# Patient Record
Sex: FEMALE | Race: WHITE | Employment: UNEMPLOYED | URBAN - METROPOLITAN AREA
[De-identification: names, ages, dates, MRNs, and addresses within clinical notes are randomized per-mention and may not be internally consistent; named-entity substitution may affect disease eponyms.]

---

## 2017-12-09 PROCEDURE — 87591 N.GONORRHOEAE DNA AMP PROB: CPT | Performed by: INTERNAL MEDICINE

## 2017-12-09 PROCEDURE — 87660 TRICHOMONAS VAGIN DIR PROBE: CPT | Performed by: INTERNAL MEDICINE

## 2017-12-09 PROCEDURE — 87480 CANDIDA DNA DIR PROBE: CPT | Performed by: INTERNAL MEDICINE

## 2017-12-09 PROCEDURE — 80061 LIPID PANEL: CPT | Performed by: INTERNAL MEDICINE

## 2017-12-09 PROCEDURE — 87510 GARDNER VAG DNA DIR PROBE: CPT | Performed by: INTERNAL MEDICINE

## 2017-12-09 PROCEDURE — 87491 CHLMYD TRACH DNA AMP PROBE: CPT | Performed by: INTERNAL MEDICINE

## 2019-08-24 ENCOUNTER — HOSPITAL ENCOUNTER (INPATIENT)
Facility: HOSPITAL | Age: 49
LOS: 2 days | Discharge: HOME | DRG: 392 | End: 2019-08-26
Attending: EMERGENCY MEDICINE | Admitting: HOSPITALIST
Payer: COMMERCIAL

## 2019-08-24 DIAGNOSIS — K51.00 PANCOLITIS (HCC): Primary | ICD-10-CM

## 2019-08-24 DIAGNOSIS — I95.9 HYPOTENSION, UNSPECIFIED HYPOTENSION TYPE: ICD-10-CM

## 2019-08-24 DIAGNOSIS — R19.7 DIARRHEA OF PRESUMED INFECTIOUS ORIGIN: ICD-10-CM

## 2019-08-24 DIAGNOSIS — E86.0 DEHYDRATION: ICD-10-CM

## 2019-08-24 LAB — C DIFF TOX B STL QL: NEGATIVE

## 2019-08-24 PROCEDURE — 99223 1ST HOSP IP/OBS HIGH 75: CPT | Performed by: HOSPITALIST

## 2019-08-24 PROCEDURE — 87086 URINE CULTURE/COLONY COUNT: CPT | Performed by: EMERGENCY MEDICINE

## 2019-08-24 PROCEDURE — 87186 SC STD MICRODIL/AGAR DIL: CPT | Performed by: EMERGENCY MEDICINE

## 2019-08-24 PROCEDURE — 87088 URINE BACTERIA CULTURE: CPT | Performed by: EMERGENCY MEDICINE

## 2019-08-24 RX ORDER — HYDROMORPHONE HYDROCHLORIDE 1 MG/ML
0.5 INJECTION, SOLUTION INTRAMUSCULAR; INTRAVENOUS; SUBCUTANEOUS EVERY 30 MIN PRN
Status: DISCONTINUED | OUTPATIENT
Start: 2019-08-24 | End: 2019-08-24

## 2019-08-24 RX ORDER — HYDROMORPHONE HYDROCHLORIDE 1 MG/ML
0.2 INJECTION, SOLUTION INTRAMUSCULAR; INTRAVENOUS; SUBCUTANEOUS EVERY 2 HOUR PRN
Status: DISCONTINUED | OUTPATIENT
Start: 2019-08-24 | End: 2019-08-26

## 2019-08-24 RX ORDER — DEXTROSE, SODIUM CHLORIDE, AND POTASSIUM CHLORIDE 5; .45; .15 G/100ML; G/100ML; G/100ML
INJECTION INTRAVENOUS CONTINUOUS
Status: DISCONTINUED | OUTPATIENT
Start: 2019-08-24 | End: 2019-08-26

## 2019-08-24 RX ORDER — ENOXAPARIN SODIUM 100 MG/ML
40 INJECTION SUBCUTANEOUS NIGHTLY
Status: DISCONTINUED | OUTPATIENT
Start: 2019-08-24 | End: 2019-08-26

## 2019-08-24 RX ORDER — METRONIDAZOLE 500 MG/100ML
500 INJECTION, SOLUTION INTRAVENOUS EVERY 8 HOURS
Status: DISCONTINUED | OUTPATIENT
Start: 2019-08-24 | End: 2019-08-26

## 2019-08-24 RX ORDER — HYDROMORPHONE HYDROCHLORIDE 1 MG/ML
0.4 INJECTION, SOLUTION INTRAMUSCULAR; INTRAVENOUS; SUBCUTANEOUS EVERY 2 HOUR PRN
Status: DISCONTINUED | OUTPATIENT
Start: 2019-08-24 | End: 2019-08-26

## 2019-08-24 RX ORDER — CIPROFLOXACIN 2 MG/ML
400 INJECTION, SOLUTION INTRAVENOUS EVERY 12 HOURS
Status: DISCONTINUED | OUTPATIENT
Start: 2019-08-24 | End: 2019-08-26

## 2019-08-24 RX ORDER — ACETAMINOPHEN 325 MG/1
650 TABLET ORAL EVERY 6 HOURS PRN
Status: DISCONTINUED | OUTPATIENT
Start: 2019-08-24 | End: 2019-08-26

## 2019-08-24 RX ORDER — ONDANSETRON 2 MG/ML
4 INJECTION INTRAMUSCULAR; INTRAVENOUS
Status: DISCONTINUED | OUTPATIENT
Start: 2019-08-24 | End: 2019-08-25 | Stop reason: ALTCHOICE

## 2019-08-24 RX ORDER — ONDANSETRON 2 MG/ML
4 INJECTION INTRAMUSCULAR; INTRAVENOUS EVERY 6 HOURS PRN
Status: DISCONTINUED | OUTPATIENT
Start: 2019-08-24 | End: 2019-08-26

## 2019-08-24 RX ORDER — ONDANSETRON 2 MG/ML
4 INJECTION INTRAMUSCULAR; INTRAVENOUS EVERY 4 HOURS PRN
Status: DISCONTINUED | OUTPATIENT
Start: 2019-08-24 | End: 2019-08-24

## 2019-08-24 RX ORDER — HYDROMORPHONE HYDROCHLORIDE 1 MG/ML
0.8 INJECTION, SOLUTION INTRAMUSCULAR; INTRAVENOUS; SUBCUTANEOUS EVERY 2 HOUR PRN
Status: DISCONTINUED | OUTPATIENT
Start: 2019-08-24 | End: 2019-08-26

## 2019-08-24 NOTE — PLAN OF CARE
Patient Joanie Jodie speaking only.  declined  but would like one to be used at night when he is not here. Staff may also call him at home. Information given in Turkish in regards to colitis and c.diff.   very concerned staff and

## 2019-08-24 NOTE — ED NOTES
Antibiotic not given, this rn unsure if meds were given [place] [facility] attempting tocall there, answering machine stating location is closed, dr. Lia Mcnamara aware, not wanting to repeat meds, will pass along to floor rn

## 2019-08-24 NOTE — ED INITIAL ASSESSMENT (HPI)
Patient with abdominal pain at [facility]. She had a CT scan performed and became hypotensive with SBP 70s. C/o headache after scan.  Patient remained hypotensive per medics despite fluid bolus of 3000 ml

## 2019-08-24 NOTE — ED NOTES
Report given to Adonay Serrano rn room 318, roc informed of meds listed to give to pt from dm and that we are unsure if they were given spose states she was not given antibiotics, but this rn still not comfortable with not knowing for sure, Adonay Serrano will speak to [employer]

## 2019-08-24 NOTE — H&P
[facility]  History and Physical     Mary Schwartz Patient Status:  Emergency    1970 MRN [id number]   Location [address] [facility] Attending Kathleen Chen MD   Hosp Day # 0 PCP None Pcp     Chief Complaint: Darryl Burnette noted in the HPI. Physical Exam:    BP 96/61   Pulse 58   Temp 98.1 °F (36.7 °C) (Oral)   Resp 16   SpO2 99%   General: No acute distress. Alert and oriented x 3. HEENT: Normocephalic atraumatic. Moist mucous membranes. EOM-I. PERRLA. Anicteric.   Neck:

## 2019-08-24 NOTE — ED PROVIDER NOTES
Patient Seen in: [facility] Emergency Department    History   Patient presents with:  Hypotension (cardiovascular)  Abdomen/Flank Pain (GI/)    Stated Complaint: Abdominal Pain    HPI    Patient was complaining of suprapubic abdominal pain. .  Pain ED Triage Vitals [08/24/19 1524]   BP 96/61   Pulse 58   Resp 16   Temp 98.1 °F (36.7 °C)   Temp src Oral   SpO2 99 %   O2 Device None (Room air)       Current:BP 97/60   Pulse 56   Temp 98.1 °F (36.7 °C) (Oral)   Resp 16   SpO2 98%         Physical Ex Impression:  Pancolitis ([facility] [address]  (primary encounter diagnosis)  Diarrhea of presumed infectious origin  Dehydration  Hypotension, unspecified hypotension type    Disposition:  Admit  8/24/2019  5:56 pm    Follow-up:  No follow-up provider specified.       Med

## 2019-08-25 LAB
ANION GAP SERPL CALC-SCNC: 4 MMOL/L (ref 0–18)
BASOPHILS # BLD AUTO: 0.03 X10(3) UL (ref 0–0.2)
BASOPHILS NFR BLD AUTO: 0.5 %
BUN BLD-MCNC: 6 MG/DL (ref 7–18)
BUN/CREAT SERPL: 9.7 (ref 10–20)
CALCIUM BLD-MCNC: 7.7 MG/DL (ref 8.5–10.1)
CHLORIDE SERPL-SCNC: 116 MMOL/L (ref 98–112)
CO2 SERPL-SCNC: 23 MMOL/L (ref 21–32)
CREAT BLD-MCNC: 0.62 MG/DL (ref 0.55–1.02)
CRYPTOSP AG STL QL IA: NEGATIVE
DEPRECATED RDW RBC AUTO: 43.1 FL (ref 35.1–46.3)
EOSINOPHIL # BLD AUTO: 0.11 X10(3) UL (ref 0–0.7)
EOSINOPHIL NFR BLD AUTO: 2 %
ERYTHROCYTE [DISTWIDTH] IN BLOOD BY AUTOMATED COUNT: 12.2 % (ref 11–15)
G LAMBLIA AG STL QL IA: NEGATIVE
GLUCOSE BLD-MCNC: 125 MG/DL (ref 70–99)
HCT VFR BLD AUTO: 33.6 % (ref 35–48)
HGB BLD-MCNC: 11.3 G/DL (ref 12–16)
IMM GRANULOCYTES # BLD AUTO: 0.02 X10(3) UL (ref 0–1)
IMM GRANULOCYTES NFR BLD: 0.4 %
LYMPHOCYTES # BLD AUTO: 1.97 X10(3) UL (ref 1–4)
LYMPHOCYTES NFR BLD AUTO: 35.6 %
MCH RBC QN AUTO: 32.5 PG (ref 26–34)
MCHC RBC AUTO-ENTMCNC: 33.6 G/DL (ref 31–37)
MCV RBC AUTO: 96.6 FL (ref 80–100)
MONOCYTES # BLD AUTO: 0.42 X10(3) UL (ref 0.1–1)
MONOCYTES NFR BLD AUTO: 7.6 %
NEUTROPHILS # BLD AUTO: 2.98 X10 (3) UL (ref 1.5–7.7)
NEUTROPHILS # BLD AUTO: 2.98 X10(3) UL (ref 1.5–7.7)
NEUTROPHILS NFR BLD AUTO: 53.9 %
OSMOLALITY SERPL CALC.SUM OF ELEC: 295 MOSM/KG (ref 275–295)
PLATELET # BLD AUTO: 204 10(3)UL (ref 150–450)
POTASSIUM SERPL-SCNC: 3.5 MMOL/L (ref 3.5–5.1)
RBC # BLD AUTO: 3.48 X10(6)UL (ref 3.8–5.3)
SODIUM SERPL-SCNC: 143 MMOL/L (ref 136–145)
WBC # BLD AUTO: 5.5 X10(3) UL (ref 4–11)

## 2019-08-25 PROCEDURE — 99232 SBSQ HOSP IP/OBS MODERATE 35: CPT | Performed by: HOSPITALIST

## 2019-08-25 NOTE — CONSULTS
[facility]  Report of GI Consultation    Vignesh Destinyariel Patient Status:  Inpatient    1970 MRN [id number]   [facility] 3NW-A Attending Kecia Metcalf MD   Hosp Day # 1 PCP None Pcp     Date of Admission:  2019  Date of (FLAGYL) 5 mg/ml IVPB premix 500 mg 500 mg Intravenous Q8H       Allergies  No Known Allergies    Review of Systems:   A comprehensive 10 point review of systems was completed. Pertinent positives and negatives noted in the the HPI.      Physical Exam:   B stool culture  2. Continue empiric antibiotics  3. Transition to oral once tolerating PO  4. Plan for 7 day course  5. Continue IVF for now  6. Colonoscopy as outpatient, if clinically improved  7.  If no improvement with abx, consider inpatient endoscopic

## 2019-08-25 NOTE — PROGRESS NOTES
[facility]  Progress Note     Gerardo Grande Patient Status:  Inpatient    1970 MRN [id number]   [facility] 3NW-A Attending Lisa Camejo MD   Hosp Day # 1 PCP None Pcp     Chief Complaint: Abdominal pain    S: Patient • metRONIDAZOLE  500 mg Intravenous Q8H       ASSESSMENT / PLAN:     1. Acute Pancolitis:  GI eval, abx, ivf's, cld, await cultures.     Plan of care: As above    Quality:  · DVT Prophylaxis: SCD's  · CODE status: Full  · Stevens: None  · Central line: None

## 2019-08-25 NOTE — PLAN OF CARE
Patient denies any pain/n/v. Tolerating clears. Voiding freely. +mucousy brown very small bms, stool sample sent off cdiff negative, occult blood negative. Contact precautions discontinued.  Patient had a red streaked very small bm this am. Continues to run

## 2019-08-26 VITALS
OXYGEN SATURATION: 100 % | WEIGHT: 144 LBS | BODY MASS INDEX: 25 KG/M2 | DIASTOLIC BLOOD PRESSURE: 54 MMHG | HEART RATE: 64 BPM | SYSTOLIC BLOOD PRESSURE: 92 MMHG | TEMPERATURE: 98 F | RESPIRATION RATE: 18 BRPM

## 2019-08-26 LAB — POTASSIUM SERPL-SCNC: 3.8 MMOL/L (ref 3.5–5.1)

## 2019-08-26 PROCEDURE — 99239 HOSP IP/OBS DSCHRG MGMT >30: CPT | Performed by: HOSPITALIST

## 2019-08-26 RX ORDER — CIPROFLOXACIN 500 MG/1
500 TABLET, FILM COATED ORAL 2 TIMES DAILY
Qty: 14 TABLET | Refills: 0 | Status: SHIPPED | OUTPATIENT
Start: 2019-08-26 | End: 2019-09-02

## 2019-08-26 RX ORDER — POTASSIUM CHLORIDE 20 MEQ/1
40 TABLET, EXTENDED RELEASE ORAL ONCE
Status: COMPLETED | OUTPATIENT
Start: 2019-08-26 | End: 2019-08-26

## 2019-08-26 NOTE — PROGRESS NOTES
NURSING DISCHARGE NOTE    Discharged Home via Ambulatory. Accompanied by Family member and Support staff  Belongings Taken by patient/familyDISCUSSED D/C INSTRUCTIONS WITH PATIENT AND FAMILY . ANSWERED ALL QUESTIONS . VERBALIZED  UNDERSTANDING .  SCRIP

## 2019-08-26 NOTE — PROGRESS NOTES
08/26/19 1059   Clinical Encounter Type   Referral To Nurse  (Zelalem Bergman made a referral to the [employer] for [employer] as requested. )   Anglican Encounters   Spiritual Requests During Visit / Hospitalization Spiritism Communion  (Pra

## 2019-08-26 NOTE — PROGRESS NOTES
[facility]  Progress Note    Cindy Srinivasan Patient Status:  Inpatient    1970 MRN [id number]   [facility] 3NW-A Attending Heidi Pradhan MD   Date 2019 PCP None Pcp     Subjective:  Cindy Srinivasan is a(n) 51

## 2019-08-26 NOTE — PROGRESS NOTES
[facility]  Progress Note     Ines Yin Patient Status:  Inpatient    1970 MRN [id number]   [facility] 3NW-A Attending Catie Solorio MD   Hosp Day # 2 PCP None Pcp     Chief Complaint: Abdominal pain    S: Patient enoxaparin  40 mg Subcutaneous Nightly   • ciprofloxacin  400 mg Intravenous Q12H   • metRONIDAZOLE  500 mg Intravenous Q8H       ASSESSMENT / PLAN:     1. Acute Pancolitis:  Advance diet, dc home.     Plan of care: As above    Quality:  · DVT Prophylaxis:

## 2019-08-26 NOTE — PLAN OF CARE
Problem: GASTROINTESTINAL - ADULT  Goal: Minimal or absence of nausea and vomiting  Description  INTERVENTIONS:  - Maintain adequate hydration with IV or PO as ordered and tolerated  - Nasogastric tube to low intermittent suction as ordered  - Evaluate e AMBULATED IN [place] . PLAN OF CARE DISCUSSED , ANSWERED ALL QUESTIONS . VERBALIZED UNDERSTANDING .  WILL CONTINUE TO MONITOR

## 2019-08-26 NOTE — PLAN OF CARE
Problem: GASTROINTESTINAL - ADULT  Goal: Minimal or absence of nausea and vomiting  Description  INTERVENTIONS:  - Maintain adequate hydration with IV or PO as ordered and tolerated  - Nasogastric tube to low intermittent suction as ordered  - Evaluate e pt verbalized understanding. Will continue to monitor.

## 2019-08-26 NOTE — PAYOR COMM NOTE
--------------  ADMISSION REVIEW     Payor: Rocio BRUNO  Subscriber #:  [id number]  Authorization Number: [id number]     Admit date: 8/24/19  Admit time: 1       Admitting Physician: Lyndon Alonzo MD  Attending Physician:  Lyndon Alonzo MD  [facility] thickening of the bladder. Possibility of cystitis may also be considered. History reviewed. No pertinent past medical history.            Past Surgical History:   Procedure Laterality Date   • TUBAL LIGATION                      Social History    [place] above. Patient is significantly tender and was hypotensive for prolonged period of time despite treatment. A more prolonged period of observation would be appropriate. Stool panel was ordered  Hydration continued.   Nausea medicine offered    I discussed antibiotics. She denies any recent travel. She has not eaten any undercooked products, no sick contacts. Past Medical History:History reviewed. No pertinent past medical history.      Past Surgical History:   Past Surgical History:   Procedure Laisha Million Positive bowel sounds. No rebound, guarding or organomegaly. Neurologic: No focal neurological deficits. CNII-XII grossly intact. Musculoskeletal: Moves all extremities. Extremities: No edema or cyanosis. Integument: No rashes or lesions.    Psychiatric IVPB premix 500 mg     Date Action Dose Route User    8/26/2019 1241 New Bag 500 mg Intravenous Floyd Jaimes RN    8/26/2019 2972 New Bag 500 mg Intravenous Lidia Funk RN    8/25/2019 1833 New Bag 500 mg Intravenous Toy Nagel RN      Potassium Ch mg 0.8 mg Intravenous Q2H PRN   ondansetron HCl (ZOFRAN) injection 4 mg 4 mg Intravenous Q6H PRN   Ciprofloxacin in D5W (CIPRO) IVPB premix SOLN 400 mg 400 mg Intravenous Q12H   metRONIDAZOLE in NaCl (FLAGYL) 5 mg/ml IVPB premix 500 mg 500 mg Intravenous Q the colon compatible with pancolitis. Follow-up is recommended. 2. Mild wall thickening of the bladder. Possibility of cystitis may also be considered.        Impression:   1. Acute onset pan-colitis     Recommendations:  2.  Await results of stool culture 7.7*  --    ALB 4.1  --   --     143  --    K 3.79 3.5 3.8   * 116*  --    CO2 21.8* 23.0  --    ALKPHO 61  --   --    AST 16  --   --    ALT 15  --   --    BILT 0.55  --   --    TP 7.1  --   --          Estimated Creatinine Clearance: 95.8 mL/

## 2019-08-27 NOTE — DISCHARGE SUMMARY
[facility]  DISCHARGE SUMMARY     Lidia Almaguer Patient Status:  Inpatient    1970 MRN [id number]   [facility] 3NW-A Attending No att. providers found   [facility] Day # 2 PCP None Pcp     Date of Admission: 2019  Date o FLORASTOR              Where to Get Your Medications      Please  your prescriptions at the location directed by your doctor or nurse    Bring a paper prescription for each of these medications  · Ciprofloxacin HCl 500 MG Tabs         [place]

## 2021-02-19 PROBLEM — H71.21: Status: ACTIVE | Noted: 2021-02-19

## 2021-02-19 PROBLEM — H66.91 RIGHT CHRONIC OTITIS MEDIA: Status: ACTIVE | Noted: 2021-02-19
